# Patient Record
Sex: MALE | Race: OTHER
[De-identification: names, ages, dates, MRNs, and addresses within clinical notes are randomized per-mention and may not be internally consistent; named-entity substitution may affect disease eponyms.]

---

## 2019-08-07 ENCOUNTER — HOSPITAL ENCOUNTER (OUTPATIENT)
Dept: HOSPITAL 61 - PCVCIMAG | Age: 74
Discharge: HOME | End: 2019-08-07
Payer: MEDICARE

## 2019-08-07 DIAGNOSIS — I87.2: Primary | ICD-10-CM

## 2019-08-07 PROCEDURE — 93970 EXTREMITY STUDY: CPT

## 2019-08-07 NOTE — PCVCIMAG
EXAM: BILATERAL SUPERFICIAL VENOUS DUPLEX



INDICATION: Leg pain and swelling.



FINDINGS: 



Right leg: No thrombus in the common femoral, main femoral, or

popliteal veins. These veins are compressible.



Right Great Saphenous Vein: At the saphenofemoral junction the

diameter is 9.6 mm, in the mid thigh it is 6.0 mm, and in the calf it

is 6.0 mm. There is significant venous insufficiency/reflux

throughout. Venous insufficiency/reflux duration is 6.7 seconds.



Right Small Saphenous Vein: At the saphenopopliteal junction the

diameter is 1.5 mm, and in the calf it is 3.1 mm. There is not

significant venous insufficiency/reflux throughout. Venous

insufficiency/reflux duration is 0 seconds. There is not a cranial

extension present.



Left leg: No thrombus in the common femoral, main femoral, or

popliteal veins. These veins are compressible.



Left Great Saphenous Vein: At the saphenofemoral junction the diameter

is 8.7 mm, in the mid thigh it is 3.2 mm, and in the calf it is 3.0

mm. There is not significant venous insufficiency/reflux throughout.

Venous insufficiency/reflux duration is 0 seconds.



Left Small Saphenous Vein: At the saphenopopliteal junction the

diameter is 1.9 mm, and in the calf it is 3.0 mm. There is not

significant venous insufficiency/reflux throughout. Venous

insufficiency/reflux duration is 0 seconds. There is not a cranial

extension present.



IMPRESSION: 

Right Great Saphenous Vein: Significant  venous insufficiency/reflux

is present as noted above.  

Right Small Saphenous Vein: No significant venous insufficiency/reflux

is present as noted above.  

Left Great Saphenous Vein: No significant venous insufficiency/reflux

is present as noted above.  

Left Small Saphenous Vein: No significant venous insufficiency/reflux

is present as noted above.  







LOC:OFFICE

## 2019-10-15 ENCOUNTER — HOSPITAL ENCOUNTER (OUTPATIENT)
Dept: HOSPITAL 61 - PCVCCLINIC | Age: 74
Discharge: HOME | End: 2019-10-15
Attending: RADIOLOGY
Payer: MEDICARE

## 2019-10-15 DIAGNOSIS — I87.2: Primary | ICD-10-CM

## 2019-10-15 DIAGNOSIS — M79.604: ICD-10-CM

## 2019-10-15 DIAGNOSIS — R00.2: ICD-10-CM

## 2019-10-15 DIAGNOSIS — I10: ICD-10-CM

## 2019-10-15 DIAGNOSIS — Z79.899: ICD-10-CM

## 2019-10-15 PROCEDURE — 93005 ELECTROCARDIOGRAM TRACING: CPT

## 2019-10-15 PROCEDURE — G0463 HOSPITAL OUTPT CLINIC VISIT: HCPCS

## 2020-06-30 ENCOUNTER — HOSPITAL ENCOUNTER (OUTPATIENT)
Dept: HOSPITAL 35 - SJCVC | Age: 75
End: 2020-06-30
Attending: INTERNAL MEDICINE
Payer: COMMERCIAL

## 2020-06-30 DIAGNOSIS — M79.604: ICD-10-CM

## 2020-06-30 DIAGNOSIS — M79.605: ICD-10-CM

## 2020-06-30 DIAGNOSIS — I10: ICD-10-CM

## 2020-06-30 DIAGNOSIS — E78.5: ICD-10-CM

## 2020-06-30 DIAGNOSIS — Z79.899: ICD-10-CM

## 2020-06-30 DIAGNOSIS — R00.2: Primary | ICD-10-CM

## 2020-07-17 ENCOUNTER — HOSPITAL ENCOUNTER (OUTPATIENT)
Dept: HOSPITAL 35 - SJCVCIMAG | Age: 75
End: 2020-07-17
Attending: INTERNAL MEDICINE
Payer: COMMERCIAL

## 2020-07-17 DIAGNOSIS — I07.1: Primary | ICD-10-CM
